# Patient Record
Sex: FEMALE | Race: WHITE | Employment: PART TIME | URBAN - NONMETROPOLITAN AREA
[De-identification: names, ages, dates, MRNs, and addresses within clinical notes are randomized per-mention and may not be internally consistent; named-entity substitution may affect disease eponyms.]

---

## 2018-05-14 ENCOUNTER — HOSPITAL ENCOUNTER (EMERGENCY)
Age: 28
Discharge: HOME | End: 2018-05-14
Payer: COMMERCIAL

## 2018-05-14 VITALS
TEMPERATURE: 98.7 F | HEIGHT: 64 IN | DIASTOLIC BLOOD PRESSURE: 74 MMHG | SYSTOLIC BLOOD PRESSURE: 131 MMHG | OXYGEN SATURATION: 97 % | HEART RATE: 84 BPM | WEIGHT: 207 LBS | BODY MASS INDEX: 35.34 KG/M2 | RESPIRATION RATE: 16 BRPM

## 2018-05-14 DIAGNOSIS — J01.01 ACUTE RECURRENT MAXILLARY SINUSITIS: Primary | ICD-10-CM

## 2018-05-14 DIAGNOSIS — J30.1 CHRONIC SEASONAL ALLERGIC RHINITIS DUE TO POLLEN: ICD-10-CM

## 2018-05-14 PROCEDURE — 99213 OFFICE O/P EST LOW 20 MIN: CPT

## 2018-05-14 PROCEDURE — 96372 THER/PROPH/DIAG INJ SC/IM: CPT

## 2018-05-14 PROCEDURE — 99213 OFFICE O/P EST LOW 20 MIN: CPT | Performed by: NURSE PRACTITIONER

## 2018-05-14 PROCEDURE — 6360000002 HC RX W HCPCS: Performed by: NURSE PRACTITIONER

## 2018-05-14 RX ORDER — FLUTICASONE PROPIONATE 50 MCG
1 SPRAY, SUSPENSION (ML) NASAL DAILY
Qty: 1 BOTTLE | Refills: 0 | Status: SHIPPED | OUTPATIENT
Start: 2018-05-14

## 2018-05-14 RX ORDER — METHYLPREDNISOLONE ACETATE 80 MG/ML
80 INJECTION, SUSPENSION INTRA-ARTICULAR; INTRALESIONAL; INTRAMUSCULAR; SOFT TISSUE ONCE
Status: COMPLETED | OUTPATIENT
Start: 2018-05-14 | End: 2018-05-14

## 2018-05-14 RX ADMIN — METHYLPREDNISOLONE ACETATE 80 MG: 80 INJECTION, SUSPENSION INTRA-ARTICULAR; INTRALESIONAL; INTRAMUSCULAR; SOFT TISSUE at 09:22

## 2018-05-14 ASSESSMENT — PAIN DESCRIPTION - LOCATION: LOCATION: THROAT;EAR

## 2018-05-14 ASSESSMENT — ENCOUNTER SYMPTOMS
CHEST TIGHTNESS: 0
COUGH: 0
DIARRHEA: 0
RHINORRHEA: 0
SORE THROAT: 0
COLOR CHANGE: 0
NAUSEA: 0
WHEEZING: 0
SHORTNESS OF BREATH: 0
SINUS PRESSURE: 1

## 2018-05-14 ASSESSMENT — PAIN DESCRIPTION - ORIENTATION: ORIENTATION: RIGHT;LEFT

## 2018-05-14 ASSESSMENT — PAIN SCALES - GENERAL: PAINLEVEL_OUTOF10: 7

## 2018-05-14 ASSESSMENT — PAIN DESCRIPTION - FREQUENCY: FREQUENCY: CONTINUOUS

## 2018-05-14 ASSESSMENT — PAIN DESCRIPTION - PAIN TYPE: TYPE: ACUTE PAIN

## 2018-05-14 ASSESSMENT — PAIN DESCRIPTION - DESCRIPTORS: DESCRIPTORS: SORE;ITCHING

## 2022-06-27 ENCOUNTER — HOSPITAL ENCOUNTER (EMERGENCY)
Age: 32
Discharge: HOME | End: 2022-06-27
Payer: COMMERCIAL

## 2022-06-27 VITALS
RESPIRATION RATE: 16 BRPM | TEMPERATURE: 97.1 F | WEIGHT: 207 LBS | SYSTOLIC BLOOD PRESSURE: 136 MMHG | BODY MASS INDEX: 35.53 KG/M2 | DIASTOLIC BLOOD PRESSURE: 64 MMHG | HEART RATE: 86 BPM | OXYGEN SATURATION: 96 %

## 2022-06-27 DIAGNOSIS — H61.21 IMPACTED CERUMEN OF RIGHT EAR: Primary | ICD-10-CM

## 2022-06-27 DIAGNOSIS — H60.391 INFECTIVE OTITIS EXTERNA OF RIGHT EAR: ICD-10-CM

## 2022-06-27 PROCEDURE — 99203 OFFICE O/P NEW LOW 30 MIN: CPT

## 2022-06-27 PROCEDURE — 99202 OFFICE O/P NEW SF 15 MIN: CPT | Performed by: NURSE PRACTITIONER

## 2022-06-27 PROCEDURE — 69209 REMOVE IMPACTED EAR WAX UNI: CPT

## 2022-06-27 RX ORDER — NEOMYCIN SULFATE, POLYMYXIN B SULFATE AND HYDROCORTISONE 10; 3.5; 1 MG/ML; MG/ML; [USP'U]/ML
3 SUSPENSION/ DROPS AURICULAR (OTIC) 3 TIMES DAILY
Qty: 1 EACH | Refills: 0 | Status: SHIPPED | OUTPATIENT
Start: 2022-06-27 | End: 2022-07-04

## 2022-06-27 ASSESSMENT — ENCOUNTER SYMPTOMS
COLOR CHANGE: 0
COUGH: 0
NAUSEA: 0
SINUS PRESSURE: 0
VOMITING: 0
RHINORRHEA: 0
SHORTNESS OF BREATH: 0
SORE THROAT: 0

## 2022-06-27 ASSESSMENT — PAIN DESCRIPTION - ORIENTATION: ORIENTATION: LEFT;RIGHT

## 2022-06-27 ASSESSMENT — PAIN - FUNCTIONAL ASSESSMENT
PAIN_FUNCTIONAL_ASSESSMENT: ACTIVITIES ARE NOT PREVENTED
PAIN_FUNCTIONAL_ASSESSMENT: 0-10

## 2022-06-27 ASSESSMENT — PAIN DESCRIPTION - FREQUENCY: FREQUENCY: CONTINUOUS

## 2022-06-27 ASSESSMENT — PAIN DESCRIPTION - ONSET: ONSET: PROGRESSIVE

## 2022-06-27 ASSESSMENT — PAIN DESCRIPTION - DESCRIPTORS: DESCRIPTORS: ACHING

## 2022-06-27 ASSESSMENT — PAIN DESCRIPTION - LOCATION: LOCATION: EAR

## 2022-06-27 ASSESSMENT — PAIN SCALES - GENERAL: PAINLEVEL_OUTOF10: 5

## 2022-06-27 NOTE — ED TRIAGE NOTES
Pt to room 7 with c/o ear pain in both ears but states the right  is worse than left and that there is drainage from the right. Pain started 4 days ago. She swims a lot.

## 2022-06-27 NOTE — ED PROVIDER NOTES
[facility]  Urgent Care Encounter       CHIEF COMPLAINT       Chief Complaint   Patient presents with    Otalgia     bilateral , right very painful and draining       Nurses Notes reviewed and I agree except as noted in the HPI. HISTORY OF PRESENT ILLNESS   Damion Hughes is a 34 y.o. female who presents to the urgent care center complaining of bilateral ear pain but states her right is a lot worse than the left ear. Patient states that she has been swimming a lot and did have drainage from the right ear. Patient states pain started about 4 days ago. She denies fever, chills, vertigo or tinnitus. Patient does have some area loss or muffled hearing to the right ear. The history is provided by the patient. No  was used. Ear Problem  Location:  Bilateral  Behind ear:  No abnormality  Quality:  Aching  Severity:  Moderate  Onset quality:  Gradual  Duration:  4 days  Timing:  Constant  Progression:  Worsening  Chronicity:  New  Context: water in ear    Associated symptoms: ear discharge    Associated symptoms: no congestion, no cough, no fever, no headaches, no neck pain, no rash, no rhinorrhea, no sore throat and no vomiting        REVIEW OF SYSTEMS     Review of Systems   Constitutional: Negative for activity change, appetite change, chills, fatigue and fever. HENT: Positive for ear discharge and ear pain. Negative for congestion, rhinorrhea, sinus pressure and sore throat. Respiratory: Negative for cough and shortness of breath. Gastrointestinal: Negative for nausea and vomiting. Musculoskeletal: Negative for neck pain and neck stiffness. Skin: Negative for color change and rash. Allergic/Immunologic: Negative for environmental allergies. Neurological: Negative for dizziness, light-headedness and headaches. Hematological: Negative for adenopathy. PAST MEDICAL HISTORY   History reviewed.  No pertinent past medical history. SURGICALHISTORY     Patient  has a past surgical history that includes Tonsillectomy and Nose surgery. CURRENT MEDICATIONS       Discharge Medication List as of 6/27/2022 11:16 AM      CONTINUE these medications which have NOT CHANGED    Details   fluticasone (FLONASE) 50 MCG/ACT nasal spray 1 spray by Nasal route daily, Disp-1 Bottle, R-0Print      Pseudoeph-Doxylamine-DM-APAP (NYQUIL PO) Take by mouth             ALLERGIES     Patient is has No Known Allergies. Patients   There is no immunization history on file for this patient. FAMILY HISTORY     Patient's family history is not on file. SOCIAL HISTORY     Patient  reports that she has never smoked. She has never used smokeless tobacco. She reports that she does not drink alcohol and does not use drugs. PHYSICAL EXAM     ED TRIAGE VITALS  BP: 136/64, Temp: 97.1 °F (36.2 °C), Heart Rate: 86, Resp: 16, SpO2: 96 %,Estimated body mass index is 35.53 kg/m² as calculated from the following:    Height as of 5/14/18: 5' 4\" (1.626 m). Weight as of this encounter: 207 lb (93.9 kg). ,Patient's last menstrual period was 06/20/2022. Physical Exam  Vitals and nursing note reviewed. Constitutional:       General: She is not in acute distress. Appearance: Normal appearance. She is well-developed. She is not ill-appearing, toxic-appearing or diaphoretic. HENT:      Head: Normocephalic. Right Ear: External ear normal. Decreased hearing noted. Swelling and tenderness present. No drainage. Left Ear: Tympanic membrane, ear canal and external ear normal. No drainage, swelling or tenderness. Tympanic membrane is not erythematous. Ears:      Comments: Patient has cerumen impacted to the right auditory canal.  Patient did have some tenderness to the right tragus with palpation. There is no signs externally of cellulitis or erythema. Nose: Nose normal.      Mouth/Throat:      Pharynx: No posterior oropharyngeal erythema.    Eyes: Pupils: Pupils are equal, round, and reactive to light. Cardiovascular:      Rate and Rhythm: Normal rate. Pulmonary:      Effort: Pulmonary effort is normal.   Musculoskeletal:         General: Normal range of motion. Cervical back: Full passive range of motion without pain, normal range of motion and neck supple. Skin:     General: Skin is warm and dry. Capillary Refill: Capillary refill takes less than 2 seconds. Neurological:      Mental Status: She is alert and oriented to person, place, and time. Psychiatric:         Behavior: Behavior is cooperative. DIAGNOSTIC RESULTS     Labs:No results found for this visit on 06/27/22. IMAGING:    No orders to display         EKG:      URGENT CARE COURSE:     Vitals:    06/27/22 1036   BP: 136/64   Pulse: 86   Resp: 16   Temp: 97.1 °F (36.2 °C)   TempSrc: Temporal   SpO2: 96%   Weight: 207 lb (93.9 kg)       Medications - No data to display         PROCEDURES:  The patient was able to tolerate a lavage to the right ear. The patient had cerumen removed from the auditory canal she stated that she could hear better although  the patient does have erythema noted to the auditory canal however there was no signs of otitis media or tympanic rupture. Tolerated procedure well. FINAL IMPRESSION      1. Impacted cerumen of right ear    2. Infective otitis externa of right ear          DISPOSITION/ PLAN      The parent or patient representative was advised that at this point the patient can be treated safely at home, the parent or Patient representative should be aware of following interventions and  advised to the watch for the following:  #1. Any increasing pain not controlled with Motrin or Tylenol. #2. Any development of drainage from the ears redness of the auricle or posterior ear.   #3.  Her development of the any fever chills, headache or stiffness of the neck the patient needs to be reevaluated by the primary care provider, return here or go to the emergency department for reevaluation. The patient or patient's representative are agreeable to the outpatient management at this time. They are advised to follow-up with her primary care provider in 2-3 days for reevaluation. The patient left ambulatory without any problems.       PATIENT REFERRED TO:  Leannasamson Silva  [address] [place] [address] / [facility] [id number]      DISCHARGE MEDICATIONS:  Discharge Medication List as of 6/27/2022 11:16 AM      START taking these medications    Details   neomycin-polymyxin-hydrocortisone (CORTISPORIN) [id number] otic suspension Place 3 drops into the left ear 3 times daily for 7 days, Disp-1 each, R-0Normal             Discharge Medication List as of 6/27/2022 11:16 AM          Discharge Medication List as of 6/27/2022 11:16 AM          DESIRAE Ibarra CNP    (Please note that portions of this note were completed with a voice recognition program. Efforts were made to edit the dictations but occasionally words are mis-transcribed.)           DESIRAE Ibarra CNP  06/27/22 1138

## 2023-05-22 ENCOUNTER — HOSPITAL ENCOUNTER (EMERGENCY)
Age: 33
Discharge: HOME | End: 2023-05-22
Payer: COMMERCIAL

## 2023-05-22 VITALS
HEART RATE: 80 BPM | DIASTOLIC BLOOD PRESSURE: 70 MMHG | TEMPERATURE: 98 F | SYSTOLIC BLOOD PRESSURE: 135 MMHG | OXYGEN SATURATION: 100 % | RESPIRATION RATE: 20 BRPM

## 2023-05-22 DIAGNOSIS — J02.0 STREPTOCOCCAL SORE THROAT: Primary | ICD-10-CM

## 2023-05-22 LAB — S PYO AG THROAT QL: POSITIVE

## 2023-05-22 PROCEDURE — 99213 OFFICE O/P EST LOW 20 MIN: CPT

## 2023-05-22 PROCEDURE — 87651 STREP A DNA AMP PROBE: CPT

## 2023-05-22 RX ORDER — AMOXICILLIN AND CLAVULANATE POTASSIUM 875; 125 MG/1; MG/1
1 TABLET, FILM COATED ORAL 2 TIMES DAILY WITH MEALS
Qty: 20 TABLET | Refills: 0 | Status: SHIPPED | OUTPATIENT
Start: 2023-05-22 | End: 2023-06-01

## 2023-05-22 RX ORDER — ZINC GLUCONATE 2 [HP_X]/1
LOZENGE ORAL
Refills: 0 | COMMUNITY
Start: 2023-05-22

## 2023-05-22 ASSESSMENT — ENCOUNTER SYMPTOMS
COUGH: 0
CHEST TIGHTNESS: 0
SHORTNESS OF BREATH: 0
SORE THROAT: 1
EYE REDNESS: 0
VOMITING: 0
DIARRHEA: 0
ABDOMINAL PAIN: 0
EYE ITCHING: 0
SINUS PRESSURE: 0
NAUSEA: 0

## 2023-05-22 NOTE — ED PROVIDER NOTES
[address] [facility] Encounter      CHIEFCOMPLAINT       Chief Complaint   Patient presents with    Pharyngitis       Nurses Notes reviewed and I agree except as noted in the HPI. HISTORY OF PRESENT ILLNESS   Miriam Art is a 28 y.o. female who presents to the urgent care. She states that her throat hurts. Started 3 days ago. The patient/patient representative has no other acute complaints at this time. REVIEW OF SYSTEMS     Review of Systems   Constitutional:  Negative for chills, fatigue and fever. HENT:  Positive for sore throat. Negative for congestion, ear pain and sinus pressure. Eyes:  Negative for redness and itching. Respiratory:  Negative for cough, chest tightness and shortness of breath. Cardiovascular:  Negative for chest pain. Gastrointestinal:  Negative for abdominal pain, diarrhea, nausea and vomiting. Skin:  Negative for rash. Allergic/Immunologic: Negative for environmental allergies and food allergies. Neurological:  Negative for headaches. Hematological:  Negative for adenopathy. PAST MEDICAL HISTORY   History reviewed. No pertinent past medical history. SURGICAL HISTORY     Patient  has a past surgical history that includes Tonsillectomy and Nose surgery. CURRENT MEDICATIONS       Discharge Medication List as of 5/22/2023 11:59 AM        CONTINUE these medications which have NOT CHANGED    Details   fluticasone (FLONASE) 50 MCG/ACT nasal spray 1 spray by Nasal route daily, Disp-1 Bottle, R-0Print      Pseudoeph-Doxylamine-DM-APAP (NYQUIL PO) Take by mouth             ALLERGIES     Patient is has No Known Allergies. FAMILY HISTORY     Patient'sfamily history is not on file. SOCIAL HISTORY     Patient  reports that she has never smoked. She has never used smokeless tobacco. She reports that she does not drink alcohol and does not use drugs.     PHYSICAL EXAM     ED TRIAGE VITALS  BP: 135/70, Temp: 98 °F (36.7 °C), Pulse:

## 2023-05-22 NOTE — DISCHARGE INSTRUCTIONS
Suggestions for over-the-counter supplements to help your immune system, if you have questions regarding allergies or contraindications to use, please speak to the pharmacy staff or your family provider. Multi-vitamin/multi-mineral daily   Vitamin D3 1000 IU daily  Zinc 30 mg daily  Vitamin C 1000 mg twice daily  B-100 complex as daily as directed on bottle  Probiotic/Prebiotic daily    Gargle with mouthwash 3 times daily, may use Scope, Crest, or Listerine. Also keep in mind that adequate nutrition, hydration, exercise, and rest are also important.

## 2024-04-01 ENCOUNTER — HOSPITAL ENCOUNTER (EMERGENCY)
Age: 34
Discharge: HOME | End: 2024-04-01
Payer: COMMERCIAL

## 2024-04-01 VITALS
DIASTOLIC BLOOD PRESSURE: 87 MMHG | SYSTOLIC BLOOD PRESSURE: 154 MMHG | HEIGHT: 64 IN | OXYGEN SATURATION: 97 % | TEMPERATURE: 97 F | HEART RATE: 84 BPM | WEIGHT: 200 LBS | RESPIRATION RATE: 18 BRPM | BODY MASS INDEX: 34.15 KG/M2

## 2024-04-01 DIAGNOSIS — J06.9 VIRAL URI: Primary | ICD-10-CM

## 2024-04-01 DIAGNOSIS — R09.81 SINUS CONGESTION: ICD-10-CM

## 2024-04-01 PROCEDURE — 99213 OFFICE O/P EST LOW 20 MIN: CPT

## 2024-04-01 PROCEDURE — 99213 OFFICE O/P EST LOW 20 MIN: CPT | Performed by: NURSE PRACTITIONER

## 2024-04-01 RX ORDER — GUAIFENESIN, PSEUDOEPHEDRINE HYDROCHLORIDE 600; 60 MG/1; MG/1
TABLET, EXTENDED RELEASE ORAL
Refills: 0 | COMMUNITY
Start: 2024-04-01 | End: 2024-04-08

## 2024-04-01 ASSESSMENT — ENCOUNTER SYMPTOMS
SINUS PAIN: 1
SINUS PRESSURE: 1
NAUSEA: 0
SORE THROAT: 0
COUGH: 0
SHORTNESS OF BREATH: 0
ABDOMINAL PAIN: 0
VOMITING: 0

## 2024-04-01 ASSESSMENT — PAIN DESCRIPTION - DESCRIPTORS: DESCRIPTORS: ACHING;PRESSURE

## 2024-04-01 ASSESSMENT — PAIN DESCRIPTION - LOCATION: LOCATION: HEAD

## 2024-04-01 ASSESSMENT — PAIN DESCRIPTION - FREQUENCY: FREQUENCY: CONTINUOUS

## 2024-04-01 ASSESSMENT — PAIN DESCRIPTION - PAIN TYPE: TYPE: ACUTE PAIN

## 2024-04-01 ASSESSMENT — PAIN - FUNCTIONAL ASSESSMENT: PAIN_FUNCTIONAL_ASSESSMENT: 0-10

## 2024-04-01 ASSESSMENT — PAIN DESCRIPTION - ONSET: ONSET: GRADUAL

## 2024-04-01 ASSESSMENT — PAIN DESCRIPTION - ORIENTATION: ORIENTATION: RIGHT;ANTERIOR

## 2024-04-01 ASSESSMENT — PAIN SCALES - GENERAL: PAINLEVEL_OUTOF10: 4

## 2024-04-01 NOTE — ED TRIAGE NOTES
Arrives to [facility] for the evaluation of possible sinus infections.  Started having nasal congestion/pressure, drainage and sinus headache 3 days ago.  Afebrile.  Respirations unlabored, no cough.  Took an at home COVID test which was negative.  Waiting provider to assess for orders.

## 2024-04-01 NOTE — DISCHARGE INSTRUCTIONS
Suggestions for symptom management that are available over the counter.   If you have questions regarding allergies or contraindications to use, please speak to the pharmacy staff or your family provider.    For fevers or pain: acetaminophen (Tylenol), ibuprofen (Motrin)  For dry cough: medications containing dextromethorphan, such as Delsym, Robitussin DM or Mucinex DM and medicated throat lozenges  For congestion or sinus pressure: decongestant nasal sprays, such as Afrin (for up to 3 days), medications containing guaifenesin to help break up mucus, such as Mucinex or Robitussin, nasal steroid sprays, such as Flonase, Sensimist, Rhinocort or Nasonex and saline nasal sprays, neti pot or sinus rinse bottle  For runny nose, sneezing or watery/itchy eyes: less sedating antihistamines, such as loratidine (Claritin), fexofenadine (Allegra) or Cetirizine (Zyrtec) and antihistamine eye drops, such as ketotifen (Zaditor, Alaway) or olopatadine (Pataday)  For sore throat:  Chloraseptic throat spray or sore throat lozenges or  Mucinex Instasoothe Sore Throat & Pain Cherry Spray  If you have high blood pressure, a brand like Coricidin HBP may be an option.you should avoid medications containing pseudoephedrine or phenylephrine, such as Sudafed,  running a humidifier in your bedroom may be helpful for many of your symptoms.  If your cough is keeping you awake at night, you can try raising your head with an extra pillow.  If the skin around your nose and lips becomes sore, you can put some petroleum jelly on the area.      Suggestions for over-the-counter supplements to help your immune system, if you have questions regarding allergies or contraindications to use, please speak to the pharmacy staff or your family provider.    Multi-vitamin/multi-mineral daily   Vitamin D3 3000 IU daily  Zinc 30 mg daily  Vitamin C 1000 mg twice daily  B-100 complex as daily as directed on bottle  Probiotic/Prebiotic avila  Gargle with

## 2024-04-01 NOTE — ED PROVIDER NOTES
[facility] URGENT CARE  UrgentCare Encounter      CHIEFCOMPLAINT       Chief Complaint   Patient presents with    Sinusitis     Pressure, Drainage, Headache/Pressure behind right eye        Nurses Notes reviewed and I agree except as noted in the HPI.  HISTORY OF PRESENT ILLNESS   Skye Stark is a 34 y.o. female who presents to the urgent care for evaluation.     She presents for evaluation of sinus pain and pressure, a lot of congestion starting Friday.  History of deviated septum.  She has been taking Mucinex which is helping    The patient/patient representative has no other acute complaints at this time.    REVIEW OF SYSTEMS     Review of Systems   Constitutional:  Negative for chills, fatigue and fever.   HENT:  Positive for congestion, postnasal drip, sinus pressure and sinus pain. Negative for ear pain and sore throat.    Respiratory:  Negative for cough and shortness of breath.    Cardiovascular:  Negative for chest pain.   Gastrointestinal:  Negative for abdominal pain, diarrhea, nausea and vomiting.   Skin:  Negative for rash.   Allergic/Immunologic: Negative for environmental allergies and food allergies.   Neurological:  Negative for headaches.   Hematological:  Negative for adenopathy.       PAST MEDICAL HISTORY   History reviewed. No pertinent past medical history.    SURGICAL HISTORY     Patient  has a past surgical history that includes Tonsillectomy and Nose surgery.    CURRENT MEDICATIONS       Discharge Medication List as of 4/1/2024 10:41 AM          ALLERGIES     Patient is has No Known Allergies.    FAMILY HISTORY     Patient'sfamily history is not on file.    SOCIAL HISTORY     Patient  reports that she has never smoked. She has never used smokeless tobacco. She reports that she does not drink alcohol and does not use drugs.    PHYSICAL EXAM     ED TRIAGE VITALS  BP: (!) 154/87, Temp: 97 °F (36.1 °C), Pulse: 84, Respirations: 18, SpO2: 97 %  Physical Exam  Vitals and nursing  Patient is afebrile, appears nontoxic, and is in no acute distress.  Symptoms are likely viral in nature at this time and recommend symptomatic management.       The results of pertinent diagnostic studies and exam findings were discussed with patient/patient representative.   Shared decision-making was performed and patient/patient representative are agreeable that they are suitable for discharge at this time.  The patient’s provisional diagnosis and plan of care were discussed with the patient/patient representative who expressed understanding.  The patient/representative is given strict written and verbal instructions about care at home, including over-the-counter management, prescription information, follow-up, and signs and symptoms of worsening of condition, and the patient/patient representative did verbalize understanding of these instructions.  Patient will go to nearest emergency department if symptoms change or worsen, or for any sign or symptom deemed emergent by the patient or family members. Follow up as an outpatient with PCP within the next 3 days, or sooner if symptoms warrant.       PATIENT REFERRED TO:  Tarsha Desouza  [address] [facility]  [address]  [facility] [id number] [phone]    Schedule an appointment as soon as possible for a visit in 3 days  For further evaluation., If symptoms change/worsen, go to the [facility]  [address] [address] [phone]  Schedule an appointment as soon as possible for a visit in 3 days  if you do not have a family provider please call to establish care, if symptoms change or worsen please go to the nearest emergency room      Mayra Saldaña, APRN - CNP    Please note that some or all of this chart was generated using [employer] voice recognition software. Although every effort was made to ensure the accuracy of this automated transcription, some errors in transcription may have

## 2024-08-19 ENCOUNTER — APPOINTMENT (OUTPATIENT)
Dept: GENERAL RADIOLOGY | Age: 34
End: 2024-08-19
Payer: COMMERCIAL

## 2024-08-19 ENCOUNTER — HOSPITAL ENCOUNTER (EMERGENCY)
Age: 34
Discharge: HOME | End: 2024-08-19
Payer: COMMERCIAL

## 2024-08-19 VITALS
HEIGHT: 63 IN | SYSTOLIC BLOOD PRESSURE: 138 MMHG | OXYGEN SATURATION: 99 % | BODY MASS INDEX: 35.44 KG/M2 | WEIGHT: 200 LBS | RESPIRATION RATE: 15 BRPM | HEART RATE: 89 BPM | DIASTOLIC BLOOD PRESSURE: 95 MMHG | TEMPERATURE: 97.5 F

## 2024-08-19 DIAGNOSIS — S93.492A SPRAIN OF ANTERIOR TALOFIBULAR LIGAMENT OF LEFT ANKLE, INITIAL ENCOUNTER: Primary | ICD-10-CM

## 2024-08-19 PROCEDURE — 73610 X-RAY EXAM OF ANKLE: CPT

## 2024-08-19 PROCEDURE — 99283 EMERGENCY DEPT VISIT LOW MDM: CPT

## 2024-08-19 PROCEDURE — 6370000000 HC RX 637 (ALT 250 FOR IP): Performed by: NURSE PRACTITIONER

## 2024-08-19 RX ORDER — NAPROXEN 250 MG/1
500 TABLET ORAL ONCE
Status: COMPLETED | OUTPATIENT
Start: 2024-08-19 | End: 2024-08-19

## 2024-08-19 RX ORDER — NAPROXEN 500 MG/1
500 TABLET ORAL 2 TIMES DAILY WITH MEALS
Qty: 30 TABLET | Refills: 0 | Status: SHIPPED | OUTPATIENT
Start: 2024-08-19 | End: 2024-09-03

## 2024-08-19 RX ADMIN — NAPROXEN 500 MG: 250 TABLET ORAL at 10:55

## 2024-08-19 ASSESSMENT — PAIN DESCRIPTION - ORIENTATION: ORIENTATION: LEFT

## 2024-08-19 ASSESSMENT — PAIN DESCRIPTION - LOCATION: LOCATION: ANKLE

## 2024-08-19 ASSESSMENT — PAIN - FUNCTIONAL ASSESSMENT: PAIN_FUNCTIONAL_ASSESSMENT: 0-10

## 2024-08-19 ASSESSMENT — PAIN SCALES - GENERAL: PAINLEVEL_OUTOF10: 8

## 2024-08-19 NOTE — ED PROVIDER NOTES
[facility] Emergency Department    CHIEF COMPLAINT       Chief Complaint   Patient presents with    Ankle Pain     L       Nurses Notes reviewed and I agree except as noted in the HPI.    HISTORY OF PRESENT ILLNESS   Skye Stark is a 34 y.o. female who presents to the ED for evaluation of left ankle injury.  Patient reports stepping in a hole 4 days ago while walking her dog.  Admits to ankle inversion.  States the swelling has not resolved and the pain has gotten worse.  Reports some radiating pain to posterior ankle and a \"resistance\" sensation with dorsiflexion.  States she is able to ambulate with some limping.  Reports a 5/10 pain while ambulating.  States ibuprofen, rest, ice have not improved symptoms.  She denies pain management at this time.     Patient denies previous trauma to the left ankle, numbness/tingling,     HPI was provided by the patient.         PAST MEDICAL HISTORY   No past medical history on file.    SURGICALHISTORY      has a past surgical history that includes Tonsillectomy and Nose surgery.    CURRENT MEDICATIONS       Discharge Medication List as of 8/19/2024 10:52 AM          ALLERGIES     has No Known Allergies.    FAMILY HISTORY     has no family status information on file.    family history is not on file.    SOCIAL HISTORY       Social History     Socioeconomic History    Marital status: Single     Spouse name: Not on file    Number of children: Not on file    Years of education: Not on file    Highest education level: Not on file   Occupational History    Not on file   Tobacco Use    Smoking status: Never    Smokeless tobacco: Never   Vaping Use    Vaping status: Never Used   Substance and Sexual Activity    Alcohol use: No    Drug use: No    Sexual activity: Not on file   Other Topics Concern    Not on file   Social History Narrative    Not on file     Social Determinants of Health     Financial Resource Strain: Not on file   Food Insecurity: Not on file   Transportation

## 2024-08-19 NOTE — ED NOTES
Presents to ED with complaints of left ankle pain. Pt reports she stepped in a hole 4 days ago and twisted her ankle. She reports ongoing pain not relieved by ibuprofen.

## 2024-12-19 ENCOUNTER — HOSPITAL ENCOUNTER (EMERGENCY)
Age: 34
Discharge: HOME | End: 2024-12-19
Payer: COMMERCIAL

## 2024-12-19 VITALS
DIASTOLIC BLOOD PRESSURE: 84 MMHG | TEMPERATURE: 97 F | BODY MASS INDEX: 33.84 KG/M2 | WEIGHT: 191 LBS | RESPIRATION RATE: 20 BRPM | OXYGEN SATURATION: 99 % | HEIGHT: 63 IN | HEART RATE: 75 BPM | SYSTOLIC BLOOD PRESSURE: 131 MMHG

## 2024-12-19 DIAGNOSIS — J01.00 ACUTE NON-RECURRENT MAXILLARY SINUSITIS: Primary | ICD-10-CM

## 2024-12-19 DIAGNOSIS — R51.9 FACIAL PAIN, ACUTE: ICD-10-CM

## 2024-12-19 PROCEDURE — 99213 OFFICE O/P EST LOW 20 MIN: CPT

## 2024-12-19 RX ORDER — FLUTICASONE PROPIONATE 50 MCG
2 SPRAY, SUSPENSION (ML) NASAL DAILY
Qty: 16 G | Refills: 0 | Status: SHIPPED | OUTPATIENT
Start: 2024-12-19

## 2024-12-19 RX ORDER — PREDNISONE 10 MG/1
TABLET ORAL
Qty: 20 TABLET | Refills: 0 | Status: SHIPPED | OUTPATIENT
Start: 2024-12-19 | End: 2024-12-29

## 2024-12-19 ASSESSMENT — PAIN DESCRIPTION - LOCATION: LOCATION: FACE

## 2024-12-19 ASSESSMENT — PAIN SCALES - GENERAL: PAINLEVEL_OUTOF10: 2

## 2024-12-19 NOTE — ED PROVIDER NOTES
[facility] URGENT CARE      URGENT CARE     Pt Name: Skye Stark  MRN: [id number]  Birthdate 1990  Date of evaluation: 12/19/2024  Provider: DESIRAE Haskins CNP    Urgent Care Encounter     CHIEF COMPLAINT       Chief Complaint   Patient presents with    Sinusitis     HISTORY OF PRESENT ILLNESS   Skye Stark is a 34 y.o. female who presents to urgent care chief complaint of sinus congestion/facial pain.  Started 4 days ago, overall worsening.  Treating with Sudafed and other OTC meds without improvement.  Explains over symptom is facial pain especially on left side \"runs right here (places hand over face over left maxillary sinus.\"  Explains it is now radiating into her left ear.  States she is barely been able to get any drainage out but 1 does come out \"its like cottage cheese.\"  Unsure if she has had fevers.    History obtained from patient  URGENT CARE TIMELINE       PAST MEDICAL HISTORY   History reviewed. No pertinent past medical history.  SURGICALHISTORY     Patient  has a past surgical history that includes Tonsillectomy and Nose surgery.  CURRENT MEDICATIONS       Discharge Medication List as of 12/19/2024 10:33 AM        ALLERGIES     Patient is has No Known Allergies.  Patients   There is no immunization history on file for this patient.  FAMILY HISTORY     Patient's family history is not on file.  SOCIAL HISTORY     Patient  reports that she has never smoked. She has never used smokeless tobacco. She reports that she does not drink alcohol and does not use drugs.  PHYSICAL EXAM     ED TRIAGE VITALS  BP: 131/84, Temp: 97 °F (36.1 °C), Pulse: 75, Respirations: 20, SpO2: 99 %,Estimated body mass index is 33.83 kg/m² as calculated from the following:    Height as of this encounter: 1.6 m (5' 3\").    Weight as of this encounter: 86.6 kg (191 lb).,Patient's last menstrual period was 12/15/2024.  Physical Exam  Vitals and nursing note reviewed.   Constitutional:        General: She is not in acute distress.     Appearance: Normal appearance. She is not ill-appearing, toxic-appearing or diaphoretic.      Comments: Recurrent congested cough upon entering room.  Patient's obviously muffled phonation from significant congestion.   HENT:      Head:        Comments: Tenderness/mild erythema at indicated area consistent with maxillary sinusitis and significant congestion.     Right Ear: Tympanic membrane, ear canal and external ear normal. There is no impacted cerumen.      Left Ear: Tympanic membrane, ear canal and external ear normal. There is no impacted cerumen.      Nose: Congestion present. No rhinorrhea.      Mouth/Throat:      Mouth: Mucous membranes are moist.      Palate: No mass and lesions.      Pharynx: No oropharyngeal exudate, posterior oropharyngeal erythema or uvula swelling.      Tonsils: No tonsillar exudate or tonsillar abscesses. 0 on the right. 0 on the left.   Eyes:      Pupils: Pupils are equal, round, and reactive to light.   Neck:      Vascular: No carotid bruit.   Cardiovascular:      Rate and Rhythm: Normal rate and regular rhythm.      Heart sounds: Normal heart sounds. No murmur heard.  Pulmonary:      Effort: Pulmonary effort is normal. No respiratory distress.      Breath sounds: No stridor. Rhonchi present. No wheezing or rales.   Chest:      Chest wall: No tenderness.   Abdominal:      Palpations: Abdomen is soft.      Tenderness: There is no abdominal tenderness. There is no guarding.   Musculoskeletal:         General: Swelling and tenderness present.      Cervical back: Tenderness present. No rigidity.   Lymphadenopathy:      Cervical: Cervical adenopathy present.   Skin:     Capillary Refill: Capillary refill takes less than 2 seconds.      Findings: Erythema present. No bruising, lesion or rash.   Neurological:      General: No focal deficit present.      Mental Status: She is alert and oriented to person, place, and time.   Psychiatric:

## 2024-12-19 NOTE — DISCHARGE INSTRUCTIONS
Please take antibio/steroids tics as prescribed for the recommended duration even if you are feeling better.  Please use Flonase twice daily for the next 5 days.    Hydrate well keeping urine clear/pale yellow.  Side effects of antibiotics including sensitivity to sun, diarrhea and may make you more vulnerable to opportunistic infections.  Please use probiotics and practice good hand hygiene while you are taking his medications.    Please see your family doctor if symptoms fail to improve or sooner if they worsen.    If any of your symptoms are urgent/emergent or out-of-control please report to urgent care/ER or call 911.    I hope you are feeling better soon!

## 2025-07-22 ENCOUNTER — HOSPITAL ENCOUNTER (EMERGENCY)
Age: 35
Discharge: HOME | End: 2025-07-22
Attending: FAMILY MEDICINE
Payer: COMMERCIAL

## 2025-07-22 ENCOUNTER — APPOINTMENT (OUTPATIENT)
Dept: GENERAL RADIOLOGY | Age: 35
End: 2025-07-22
Payer: COMMERCIAL

## 2025-07-22 VITALS
DIASTOLIC BLOOD PRESSURE: 96 MMHG | BODY MASS INDEX: 35.7 KG/M2 | OXYGEN SATURATION: 97 % | HEIGHT: 62 IN | HEART RATE: 79 BPM | TEMPERATURE: 97.9 F | WEIGHT: 194 LBS | SYSTOLIC BLOOD PRESSURE: 130 MMHG | RESPIRATION RATE: 16 BRPM

## 2025-07-22 DIAGNOSIS — S66.411A STRAIN OF INTRINSIC MUSCLE OF RIGHT THUMB: Primary | ICD-10-CM

## 2025-07-22 PROCEDURE — 73130 X-RAY EXAM OF HAND: CPT

## 2025-07-22 PROCEDURE — 99283 EMERGENCY DEPT VISIT LOW MDM: CPT

## 2025-07-22 PROCEDURE — 29125 APPL SHORT ARM SPLINT STATIC: CPT

## 2025-07-22 NOTE — DISCHARGE INSTRUCTIONS
WE DIAGNOSED YOU WITH THUMB SPRAIN.    WEAR THUMB SPICA SPLINT FOR SUPPORT.    TAKE 600MG OF IBUPROFEN OR MOTRIN 2-3 TIMES DAILY.    APPLY ICE AS NEEDED.    REST.

## 2025-07-22 NOTE — ED PROVIDER NOTES
EMERGENCY DEPARTMENT ENCOUNTER     CHIEF COMPLAINT   Chief Complaint   Patient presents with    Finger Pain     Right thumb        HPI   Skye Stark is a 35 y.o. female who presents with a musculoskeletal injury or complaint. The onset was PTA. The reason why the patient has this issue was she was using her right hand a lot over the weekend with yardwork and playing with her children. The patient complains of right proximal thumb pain and spasm. The quality is throbbing. The situation was: as stated. The patient has no associated paresthesia, cyanosis, puncture wound or cuts.     PAST MEDICAL & SURGICAL HISTORY   No past medical history on file.   Past Surgical History:   Procedure Laterality Date    NOSE SURGERY      TONSILLECTOMY      T&A        CURRENT MEDICATIONS   Current Outpatient Rx   Medication Sig Dispense Refill    fluticasone (FLONASE) 50 MCG/ACT nasal spray 2 sprays by Each Nostril route daily 16 g 0        ALLERGIES   No Known Allergies     SOCIAL & FAMILY HISTORY   Social History     Socioeconomic History    Marital status: Single   Tobacco Use    Smoking status: Never    Smokeless tobacco: Never   Vaping Use    Vaping status: Never Used   Substance and Sexual Activity    Alcohol use: No    Drug use: No      No family history on file.     I have reviewed patient's social, medical and surgical history.   I have reviewed and agreed with all nursing notes.    PHYSICAL EXAM   VITAL SIGNS: BP (!) 130/96   Pulse 79   Temp 97.9 °F (36.6 °C) (Oral)   Resp 16   Ht 1.575 m (5' 2\")   Wt 88 kg (194 lb)   LMP 07/08/2025   SpO2 97%   BMI 35.48 kg/m²    Constitutional: Well developed, well nourished, no acute distress   Musculoskeletal: No edema, tenderness to palpation of UCL ligament atop right proximal thumb - positive Finkelstein sign.   Integument: Well hydrated, no petechiae   Neurologic: Alert & oriented, 5/5  strength noted in affected right hand.      RADIOLOGY   XR HAND RIGHT (MIN 3 VIEWS)

## 2025-07-22 NOTE — ED NOTES
Patient presents to the ED with complaints of having right thumb pain. No known injury to the area.